# Patient Record
(demographics unavailable — no encounter records)

---

## 2025-01-02 NOTE — PHYSICAL EXAM
[Well-appearing] : well-appearing [Normocephalic] : normocephalic [No dysmorphic facial features] : no dysmorphic facial features [No ocular abnormalities] : no ocular abnormalities [Neck supple] : neck supple [No deformities] : no deformities [Alert] : alert [Conversant] : conversant [Normal speech and language] : normal speech and language [No facial asymmetry or weakness] : no facial asymmetry or weakness [Gets up on table without difficulty] : gets up on table without difficulty [Good walking balance] : good walking balance [Normal gait] : normal gait [de-identified] : Doesn't maintain eye contact

## 2025-01-02 NOTE — CONSULT LETTER
[Dear  ___] : Dear  [unfilled], [Courtesy Letter:] : I had the pleasure of seeing your patient, [unfilled], in my office today. [Please see my note below.] : Please see my note below. [Consult Closing:] : Thank you very much for allowing me to participate in the care of this patient.  If you have any questions, please do not hesitate to contact me. [Sincerely,] : Sincerely, [FreeTextEntry3] : Christos Burroughs NP-BC Certified Family Nurse Practitioner Pediatric Neurology [facility]

## 2025-01-02 NOTE — HISTORY OF PRESENT ILLNESS
[FreeTextEntry1] : Crow is a 10 y/o boy seen today for an initial visit for inattention and behavioral concerns. He was diagnosed with Autism in 3/2017, and he was diagnosed with ADHD 01/2019 by a previous psychologist.  He was taking Focalin 10 mg ER; Focalin was discontinued and now is on guanfacine 1mg ER. He is unable to self-regulate. He started medication Nov 2023. He also tried quillivant and mom reported that made him more agitated. He is unable to focus, easily distracted and impulsive. Mom reports that he is anxious over things he feels he can't complete and that's when the behaviors occur.   At school, he is aggressive to staff, peers and the teacher. He is easily triggered. He was crying and screaming in school and these behaviors and parents are concerned that he is anxious. Parents stopped Focalin thinking it was because of the stimulant. Behaviors improved but he still is unable to focus.     Early development: CROW was born full term via NVD. he was discharged home with mother. he hit all early developmental milestones appropriately except for speech.     Educational assessment: Behavior concerns  Current Grade: 5th grade  Current District: [employer]  General ED/Any Accommodations/ICT in place: In a special education class, 12:2:2 class   Hyperactivity: Fidgety    Impulsivity:  Can be aggressive when he gets upset. Interrupts when others are speaking, has a hard time waiting   Social Concerns: Is unable to self-regulate emotions, have emotional outburst  Sleep: sleeps well Eating: eats a varied diet    Family hx of developmental delays/ADD/ADHD: Denies  Other coexisting behaviors? -Mood disorder/depression: Denies  -Anxiety: Denies      Co- morbidities: No concern for anxiety, depression, OCD   Other health concerns: + staring episodes, twitching, seizure or seizure-like activity. No serious head injury, meningoencephalitis.

## 2025-01-02 NOTE — REASON FOR VISIT
[Initial Consultation] : an initial consultation for [ADHD] : ADHD [Mother] : mother [Patient] : patient [Father] : father [Parents] : parents

## 2025-01-02 NOTE — BIRTH HISTORY
[At Term] : at term [place] [Normal Vaginal Route] : by normal vaginal route [None] : there were no delivery complications [Speech Delay w/ Normal Development] : patient has speech delay with normal development [Occupational Therapy] : occupational therapy [Speech Therapy] : speech therapy [Age Appropriate] : age appropriate developmental milestones not met [FreeTextEntry3] : Walked at 16 months and started speech therapy at 2.5 years

## 2025-01-02 NOTE — ASSESSMENT
[FreeTextEntry1] : Crow is a 10 y/o boy with ASD and ADHD diagnosed by previous provider here today for behavior concerns. Currently being seen by a developmental pediatrician and has tried Focalin 10 mg ER and Quillivant. Parents d/c Focalin and Quillivant due to behavior issues. He is currently on Guanfacine 1 mg ER and mom reports that she will follow-up with Dev peds in 1 month. Mom reports staring episodes but unsure if its behavioral. Will do routine EEG to rule out seizures. Recommended continue with guanfacine 1 mg ER and follow-up with Dev peds. Parents will consider transferring care to office.

## 2025-01-02 NOTE — PLAN
[FreeTextEntry1] : - Referral to child psychiatry for concerns of anxiety  - Information given for Dr. Razo psychologist for therapy  -Parents will bring in genetics report - Continue with guanfacine 1 mg ER and consider adding Adderall  - routine EEG for staring episodes  -Message for SW for help with finding behavioral therapy  - Follow-up in 1 month or after EEG

## 2025-01-31 NOTE — HISTORY OF PRESENT ILLNESS
[FreeTextEntry1] : 10 yo male presents to office accompanied by his mother. She states that he fell on an electrical box at school on January 6th, he had a cut on his cheek which was repaired in the ED at [facility] with 2 prolene sutures. The patient was then seen at [facility] on january 14th for suture removal. She states that the area has since healed but its hard and raised. Denies drainage, redness or signs of infection. PMHx: asthma, ASD. PSHX; none NKDA

## 2025-01-31 NOTE — HISTORY OF PRESENT ILLNESS
[FreeTextEntry1] : 10 yo male presents to office accompanied by his mother. She states that he fell on an electrical box at school on January 6th, he had a cut on his cheek which was repaired in the ED at [facility] with 2 prolene sutures. The patient was then seen at [facility] on january 14th for suture removal. She states that the area has since healed but its hard and raised. Denies drainage, redness or signs of infection. PMHx: asthma, ASD. PSHX; none NKDA

## 2025-02-10 NOTE — END OF VISIT
[Time Spent: ___ minutes] : I have spent [unfilled] minutes of time on the encounter which excludes teaching and separately reported services. [FreeTextEntry3] :   I, Dr. Urban, personally performed the evaluation and management (E/M) services for this established patient who presents today with (a) new problem(s)/exacerbation of (an) existing condition(s).? That E/M includes conducting the clinically appropriate interval history &/or exam, assessing all new/exacerbated conditions, and establishing a new plan of care. Today, my FLORY, [place], was here to observe my evaluation and management service for this new problem/exacerbated condition and follow the plan of care established by me going forward. Attesting Faculty: See Attending Electronic Signature Below

## 2025-02-10 NOTE — PHYSICAL EXAM
[Well-appearing] : well-appearing [Normocephalic] : normocephalic [No dysmorphic facial features] : no dysmorphic facial features [No ocular abnormalities] : no ocular abnormalities [Neck supple] : neck supple [No deformities] : no deformities [Alert] : alert [Conversant] : conversant [Normal speech and language] : normal speech and language [No facial asymmetry or weakness] : no facial asymmetry or weakness [Gets up on table without difficulty] : gets up on table without difficulty [Good walking balance] : good walking balance [Normal gait] : normal gait [de-identified] : Doesn't maintain eye contact

## 2025-02-10 NOTE — ASSESSMENT
[FreeTextEntry1] : Crow is a 10 y/o boy with ASD and ADHD diagnosed by previous provider here today for ADHD follow-up. Currently being seen by a developmental pediatrician who increased guanfacine to 2 mg ER and although hyperactivity and impulsivity improved pt. BP decreased since last visit and is 84/46. Routine EEG reviewed and was abnormal. Will do ambulatory EEG and start Qelbree 100 mg; D/C guanfacine 2 mg ER.

## 2025-02-10 NOTE — HISTORY OF PRESENT ILLNESS
[FreeTextEntry1] : INTERVAL HX 02/10/2025 Crow is a 10 y/o boy with autism seen today for a follow-up visit for ADHD. Currently on Guanfacine 2 mg ER and doing better on that dose. Mom reports that guanfacine 1 mg wasn't working well and since increasing it, mom reports that he is less impulsive and more aggregable. Mom follows with developmental peds who increased the guanfacine to 2 mg but was concerned about hypotension.   Routine EEG reviewed, Showed generalized cortical hyperexcitability but no seizures or events captured. __________________________________________________________ PREVIOUS VISIT 01/02/2025 Crow is a 10 y/o boy seen today for an initial visit for inattention and behavioral concerns. He was diagnosed with Autism in 3/2017, and he was diagnosed with ADHD 01/2019 by a previous psychologist.  He was taking Focalin 10 mg ER; Focalin was discontinued and now is on guanfacine 1mg ER. He is unable to self-regulate. He started medication Nov 2023. He also tried quillivant and mom reported that made him more agitated. He is unable to focus, easily distracted and impulsive. Mom reports that he is anxious/OCD over things he feels he can't complete and that's when the behaviors occur.   At school, he is aggressive to staff, peers and the teacher. He is easily triggered. He was crying and screaming in school and these behaviors and parents are concerned that he is anxious. Parents stopped Focalin thinking it was because of the stimulant. Behaviors improved but he still is unable to focus.     Early development: CROW was born full term via NVD. he was discharged home with mother. he hit all early developmental milestones appropriately except for speech.     Educational assessment: Behavior concerns  Current Grade: 5th grade  Current District: [employer]  General ED/Any Accommodations/ICT in place: In a special education class, 12:2:2 class   Hyperactivity: Fidgety    Impulsivity:  Can be aggressive when he gets upset. Interrupts when others are speaking, has a hard time waiting   Social Concerns: Is unable to self-regulate emotions, have emotional outburst  Sleep: sleeps well Eating: eats a varied diet    Family hx of developmental delays/ADD/ADHD: Denies  Other coexisting behaviors? -Mood disorder/depression: Denies  -Anxiety: Denies      Co- morbidities: No concern for anxiety, depression, OCD   Other health concerns: + staring episodes, twitching, seizure or seizure-like activity. No serious head injury, meningoencephalitis.

## 2025-02-10 NOTE — CONSULT LETTER
[Dear  ___] : Dear  [unfilled], [Courtesy Letter:] : I had the pleasure of seeing your patient, [unfilled], in my office today. [Please see my note below.] : Please see my note below. [Consult Closing:] : Thank you very much for allowing me to participate in the care of this patient.  If you have any questions, please do not hesitate to contact me. [Sincerely,] : Sincerely, [FreeTextEntry3] : Christos Burroughs NP-BC Certified Family Nurse Practitioner Pediatric Neurology [facility]

## 2025-02-10 NOTE — END OF VISIT
[Time Spent: ___ minutes] : I have spent [unfilled] minutes of time on the encounter which excludes teaching and separately reported services. [FreeTextEntry3] :   I, Dr. Urban, personally performed the evaluation and management (E/M) services for this established patient who presents today with (a) new problem(s)/exacerbation of (an) existing condition(s).? That E/M includes conducting the clinically appropriate interval history &/or exam, assessing all new/exacerbated conditions, and establishing a new plan of care. Today, my FLORY, Cur[place], was here to observe my evaluation and management service for this new problem/exacerbated condition and follow the plan of care established by me going forward. Attesting Faculty: See Attending Electronic Signature Below

## 2025-02-10 NOTE — PLAN
[FreeTextEntry1] : CURRENT PLAN 02/10/2025 - AEEG, due to abnormal REEG  - Continue with therapy with Dr. Razo  - D/C guanfacine 2 mg and start Qelbree 100 mg  - Follow-up in 1 month  ___________________________________  PREVIOUS PLAN 01/02/2025 - Referral to child psychiatry for concerns of anxiety  - Information given for Dr. Razo psychologist for therapy  -Parents will bring in genetics report - Continue with guanfacine 1 mg ER and consider adding Adderall  - routine EEG for staring episodes  -Message for  for help with finding behavioral therapy  - Follow-up in 1 month or after EEG

## 2025-02-10 NOTE — DATA REVIEWED
[FreeTextEntry1] :   Recording Technique This is a 21-channel EEG recording done in the awake, drowsy and asleep states. A digital recording was obtained placing electrodes utilizing the International 10-20 System of electrode placement. A single channel EKG was also recorded. Standard montages were used for review.   Background The background activity during wakefulness was well organized. It was comprised of symmetric mixture of frequencies appropriate for the patient's age. There was a well-modulated 8-9 Hz posterior dominant rhythm with appropriate anterior-to-posterior gradient seen. During drowsiness, slow rolling eye movements, attenuation and fragmentation of the posterior dominant rhythm and diffuse background slowing. Stage II sleep there are sleep spindles, vertex, and k-complexes seen.    Slowing No focal or generalized slowing was noted.   Interictal Activity/Events Rare 1 second burst of irregular generalized spike and wave discharge.   Attenuation & Asymmetry None.   Activation Procedures Hyperventilation was not performed. Photic stimulation between 2-20 Hertz was not associated with a driving response or epileptiform discharges.   EKG No clear abnormalities were noted.   Video No clinical events noted during this study.   Impression This is an abnormal awake and drowsy video EEG due to: Rare fragmented burst of irregular generalized spike and wave discharge.   Clinical Correlation The above findings are suggestive of generalized cortical hyperexcitability but no seizures or events captured.

## 2025-02-10 NOTE — CONSULT LETTER
[Dear  ___] : Dear  [unfilled], [Courtesy Letter:] : I had the pleasure of seeing your patient, [unfilled], in my office today. [Please see my note below.] : Please see my note below. [Consult Closing:] : Thank you very much for allowing me to participate in the care of this patient.  If you have any questions, please do not hesitate to contact me. [Sincerely,] : Sincerely, [FreeTextEntry3] : Christos Burroughs NP-BC Certified Family Nurse Practitioner Pediatric Neurology [facility]

## 2025-02-10 NOTE — PHYSICAL EXAM
[Well-appearing] : well-appearing [Normocephalic] : normocephalic [No dysmorphic facial features] : no dysmorphic facial features [No ocular abnormalities] : no ocular abnormalities [Neck supple] : neck supple [No deformities] : no deformities [Alert] : alert [Conversant] : conversant [Normal speech and language] : normal speech and language [No facial asymmetry or weakness] : no facial asymmetry or weakness [Gets up on table without difficulty] : gets up on table without difficulty [Good walking balance] : good walking balance [Normal gait] : normal gait [de-identified] : Doesn't maintain eye contact

## 2025-02-10 NOTE — HISTORY OF PRESENT ILLNESS
[FreeTextEntry1] : INTERVAL HX 02/10/2025 Crow is a 10 y/o boy with autism seen today for a follow-up visit for ADHD. Currently on Guanfacine 2 mg ER and doing better on that dose. Mom reports that guanfacine 1 mg wasn't working well and since increasing it, mom reports that he is less impulsive and more aggregable. Mom follows with developmental peds who increased the guanfacine to 2 mg but was concerned about hypotension.   Routine EEG reviewed, Showed generalized cortical hyperexcitability but no seizures or events captured. __________________________________________________________ PREVIOUS VISIT 01/02/2025 Crow is a 10 y/o boy seen today for an initial visit for inattention and behavioral concerns. He was diagnosed with Autism in 3/2017, and he was diagnosed with ADHD 01/2019 by a previous psychologist.  He was taking Focalin 10 mg ER; Focalin was discontinued and now is on guanfacine 1mg ER. He is unable to self-regulate. He started medication Nov 2023. He also tried quillivant and mom reported that made him more agitated. He is unable to focus, easily distracted and impulsive. Mom reports that he is anxious/OCD over things he feels he can't complete and that's when the behaviors occur.   At school, he is aggressive to staff, peers and the teacher. He is easily triggered. He was crying and screaming in school and these behaviors and parents are concerned that he is anxious. Parents stopped Focalin thinking it was because of the stimulant. Behaviors improved but he still is unable to focus.     Early development: CROW was born full term via NVD. he was discharged home with mother. he hit all early developmental milestones appropriately except for speech.     Educational assessment: Behavior concerns  Current Grade: 5th grade  Current District: [employer]  General ED/Any Accommodations/ICT in place: In a special education class, 12:2:2 class   Hyperactivity: Fidgety    Impulsivity:  Can be aggressive when he gets upset. Interrupts when others are speaking, has a hard time waiting   Social Concerns: Is unable to self-regulate emotions, have emotional outburst  Sleep: sleeps well Eating: eats a varied diet    Family hx of developmental delays/ADD/ADHD: Denies  Other coexisting behaviors? -Mood disorder/depression: Denies  -Anxiety: Denies      Co- morbidities: No concern for anxiety, depression, OCD   Other health concerns: + staring episodes, twitching, seizure or seizure-like activity. No serious head injury, meningoencephalitis.

## 2025-02-10 NOTE — REASON FOR VISIT
[Initial Consultation] : an initial consultation for [ADHD] : ADHD [Mother] : mother [Father] : father [Patient] : patient [Parents] : parents

## 2025-03-27 NOTE — REASON FOR VISIT
[Follow-Up: _____] : a [unfilled] follow-up visit [Parent] : parent [FreeTextEntry1] : right cheek with healed 1cm scar.

## 2025-03-27 NOTE — HISTORY OF PRESENT ILLNESS
[FreeTextEntry1] : Patient was status post fall in January 2025 with resultant laceration, hematoma and likely organized hematoma as well as a scar.  Mom reports that she has been using both the silicone gel and silicone patch and vitamin E to massage the area.  There is still induration.  But the scar is fading in color.  No pain no drainage